# Patient Record
Sex: FEMALE | Race: BLACK OR AFRICAN AMERICAN | Employment: FULL TIME | URBAN - NONMETROPOLITAN AREA
[De-identification: names, ages, dates, MRNs, and addresses within clinical notes are randomized per-mention and may not be internally consistent; named-entity substitution may affect disease eponyms.]

---

## 2023-02-10 ENCOUNTER — APPOINTMENT (OUTPATIENT)
Dept: ULTRASOUND IMAGING | Age: 33
End: 2023-02-10
Attending: INTERNAL MEDICINE
Payer: COMMERCIAL

## 2023-02-10 ENCOUNTER — HOSPITAL ENCOUNTER (EMERGENCY)
Age: 33
Discharge: HOME | End: 2023-02-10
Attending: INTERNAL MEDICINE | Admitting: INTERNAL MEDICINE
Payer: COMMERCIAL

## 2023-02-10 VITALS
SYSTOLIC BLOOD PRESSURE: 125 MMHG | WEIGHT: 144 LBS | TEMPERATURE: 97.7 F | OXYGEN SATURATION: 100 % | DIASTOLIC BLOOD PRESSURE: 73 MMHG | HEIGHT: 65 IN | RESPIRATION RATE: 16 BRPM | BODY MASS INDEX: 23.99 KG/M2 | HEART RATE: 78 BPM

## 2023-02-10 DIAGNOSIS — O20.0 THREATENED ABORTION: Primary | ICD-10-CM

## 2023-02-10 LAB
ABO + RH BLD: NORMAL
ANION GAP SERPL CALC-SCNC: 8 MMOL/L (ref 3–18)
BASOPHILS # BLD: 0 K/UL (ref 0–0.1)
BASOPHILS NFR BLD: 0 % (ref 0–2)
BUN SERPL-MCNC: 4 MG/DL (ref 7–18)
BUN/CREAT SERPL: 6 (ref 12–20)
CA-I BLD-MCNC: 9 MG/DL (ref 8.5–10.1)
CHLORIDE SERPL-SCNC: 105 MMOL/L (ref 100–111)
CO2 SERPL-SCNC: 25 MMOL/L (ref 21–32)
CREAT SERPL-MCNC: 0.67 MG/DL (ref 0.6–1.3)
DIFFERENTIAL METHOD BLD: ABNORMAL
EOSINOPHIL # BLD: 0 K/UL (ref 0–0.4)
EOSINOPHIL NFR BLD: 0 % (ref 0–5)
ERYTHROCYTE [DISTWIDTH] IN BLOOD BY AUTOMATED COUNT: 12.6 % (ref 11.6–14.5)
GLUCOSE SERPL-MCNC: 93 MG/DL (ref 74–99)
HCG SERPL-ACNC: 1383 MIU/ML (ref 0–10)
HCT VFR BLD AUTO: 36.4 % (ref 35–45)
HGB BLD-MCNC: 12 G/DL (ref 12–16)
IMM GRANULOCYTES # BLD AUTO: 0 K/UL (ref 0–0.04)
IMM GRANULOCYTES NFR BLD AUTO: 0 % (ref 0–0.5)
LYMPHOCYTES # BLD: 1.8 K/UL (ref 0.9–3.6)
LYMPHOCYTES NFR BLD: 28 % (ref 21–52)
MCH RBC QN AUTO: 30.8 PG (ref 24–34)
MCHC RBC AUTO-ENTMCNC: 33 G/DL (ref 31–37)
MCV RBC AUTO: 93.6 FL (ref 78–100)
MONOCYTES # BLD: 0.4 K/UL (ref 0.05–1.2)
MONOCYTES NFR BLD: 6 % (ref 3–10)
NEUTS SEG # BLD: 4.1 K/UL (ref 1.8–8)
NEUTS SEG NFR BLD: 66 % (ref 40–73)
NRBC # BLD: 0 K/UL (ref 0–0.01)
NRBC BLD-RTO: 0 PER 100 WBC
PLATELET # BLD AUTO: 282 K/UL (ref 135–420)
PMV BLD AUTO: 10.3 FL (ref 9.2–11.8)
POTASSIUM SERPL-SCNC: 3.9 MMOL/L (ref 3.5–5.5)
RBC # BLD AUTO: 3.89 M/UL (ref 4.2–5.3)
SODIUM SERPL-SCNC: 138 MMOL/L (ref 136–145)
WBC # BLD AUTO: 6.3 K/UL (ref 4.6–13.2)

## 2023-02-10 PROCEDURE — 76801 OB US < 14 WKS SINGLE FETUS: CPT

## 2023-02-10 PROCEDURE — 84702 CHORIONIC GONADOTROPIN TEST: CPT

## 2023-02-10 PROCEDURE — 99284 EMERGENCY DEPT VISIT MOD MDM: CPT | Performed by: INTERNAL MEDICINE

## 2023-02-10 PROCEDURE — 86900 BLOOD TYPING SEROLOGIC ABO: CPT

## 2023-02-10 PROCEDURE — 85025 COMPLETE CBC W/AUTO DIFF WBC: CPT

## 2023-02-10 PROCEDURE — 80048 BASIC METABOLIC PNL TOTAL CA: CPT

## 2023-02-10 RX ORDER — VITAMIN A ACETATE, BETA CAROTENE, ASCORBIC ACID, CHOLECALCIFEROL, .ALPHA.-TOCOPHEROL ACETATE, DL-, THIAMINE MONONITRATE, RIBOFLAVIN, NIACINAMIDE, PYRIDOXINE HYDROCHLORIDE, FOLIC ACID, CYANOCOBALAMIN, CALCIUM CARBONATE, FERROUS FUMARATE, ZINC OXIDE, CUPRIC OXIDE 3080; 12; 120; 400; 1; 1.84; 3; 20; 22; 920; 25; 200; 27; 10; 2 [IU]/1; UG/1; MG/1; [IU]/1; MG/1; MG/1; MG/1; MG/1; MG/1; [IU]/1; MG/1; MG/1; MG/1; MG/1; MG/1
1 TABLET, FILM COATED ORAL DAILY
COMMUNITY

## 2023-02-10 NOTE — ED PROVIDER NOTES
[facility] EMERGENCY DEPT  EMERGENCY DEPARTMENT HISTORY AND PHYSICAL EXAM      Date: 2/10/2023  Patient Name: Nicole Gill  MRN: [id number]  [place]: 1990  Date of evaluation: 2/10/2023  Provider: Jorje Meraz MD   Note Started: 5:24 PM 2/10/23    HISTORY OF PRESENT ILLNESS     Chief Complaint   Patient presents with    Vaginal Bleeding       History Provided By: Patient    HPI: Nicole Gill, 33 y.o. female with no significant medical history is with vaginal spotting. Patient is  2 para 1. She had a period 2023. She states that she took a home pregnancy test that was positive and when she noted some spotting today she came to have this evaluated. She denies any pelvic pain. No vaginal discharge. She states that she is healthy takes no medications and has no known drug allergies. OB: [facility]  PAST MEDICAL HISTORY   Past Medical History:  History reviewed. No pertinent past medical history. Past Surgical History:  History reviewed. No pertinent surgical history. Family History:  History reviewed. No pertinent family history. Social History:  Social History     Tobacco Use    Smoking status: Never    Smokeless tobacco: Never   Vaping Use    Vaping Use: Never used   Substance Use Topics    Alcohol use: Not Currently    Drug use: Never       Allergies:  No Known Allergies    PCP: Oswald Leslie NP    Current Meds:   Previous Medications    PRENATAL VIT-CALCIUM-IRON-FA (PRENATAL PLUS, CALCIUM CARB,) 27 MG IRON- 1 MG TAB    Take 1 Tablet by mouth daily. REVIEW OF SYSTEMS   Review of Systems   Constitutional:  Negative for chills and fever. HENT:  Negative for sore throat. Respiratory:  Negative for cough and shortness of breath. Gastrointestinal:  Negative for abdominal pain, diarrhea, nausea and vomiting. Genitourinary:  Positive for vaginal bleeding. Negative for dysuria and vaginal discharge. Musculoskeletal:  Negative for arthralgias. Neurological:  Negative for headaches. Psychiatric/Behavioral:  Negative for confusion. Positives and Pertinent negatives as per HPI. PHYSICAL EXAM     ED Triage Vitals [02/10/23 1437]   ED Encounter Vitals Group      /75      Pulse (Heart Rate) 80      Resp Rate 17      Temp 97.7 °F (36.5 °C)      Temp src       O2 Sat (%) 99 %      Weight 144 lb      Height 5' 5\"      Physical Exam  Vitals and nursing note reviewed. Constitutional:       Appearance: Normal appearance. Eyes:      Extraocular Movements: Extraocular movements intact. Conjunctiva/sclera: Conjunctivae normal.   Cardiovascular:      Rate and Rhythm: Regular rhythm. Heart sounds: Normal heart sounds. No murmur heard. Pulmonary:      Effort: No respiratory distress. Breath sounds: Normal breath sounds. No wheezing. Abdominal:      General: Bowel sounds are normal.      Tenderness: There is no abdominal tenderness. There is no guarding. Genitourinary:     Comments: AMIE [place]  Normal external genitalia; os closed; small amount of blood from os. No adnexal pain or masses; uterus feels normal.  Skin:     General: Skin is warm and dry. Capillary Refill: Capillary refill takes less than 2 seconds. Neurological:      Mental Status: She is oriented to person, place, and time.        SCREENINGS               No data recorded      LAB, EKG AND DIAGNOSTIC RESULTS   Labs:  Recent Results (from the past 12 hour(s))   CBC WITH AUTOMATED DIFF    Collection Time: 02/10/23  4:30 PM   Result Value Ref Range    WBC 6.3 4.6 - 13.2 K/uL    RBC 3.89 (L) 4.20 - 5.30 M/uL    HGB 12.0 12.0 - 16.0 g/dL    HCT 36.4 35.0 - 45.0 %    MCV 93.6 78.0 - 100.0 FL    MCH 30.8 24.0 - 34.0 PG    MCHC 33.0 31.0 - 37.0 g/dL    RDW 12.6 11.6 - 14.5 %    PLATELET 716 660 - 182 K/uL    MPV 10.3 9.2 - 11.8 FL    NRBC 0.0 0.0  WBC    ABSOLUTE NRBC 0.00 0.00 - 0.01 K/uL    NEUTROPHILS 66 40 - 73 %    LYMPHOCYTES 28 21 - 52 %    MONOCYTES 6 3 - 10 %    EOSINOPHILS 0 0 - 5 %    BASOPHILS 0 0 - 2 %    IMMATURE GRANULOCYTES 0 0 - 0.5 %    ABS. NEUTROPHILS 4.1 1.8 - 8.0 K/UL    ABS. LYMPHOCYTES 1.8 0.9 - 3.6 K/UL    ABS. MONOCYTES 0.4 0.05 - 1.2 K/UL    ABS. EOSINOPHILS 0.0 0.0 - 0.4 K/UL    ABS. BASOPHILS 0.0 0.0 - 0.1 K/UL    ABS. IMM. GRANS. 0.0 0.00 - 0.04 K/UL    DF AUTOMATED     BLOOD TYPE, (ABO+RH)    Collection Time: 02/10/23  4:30 PM   Result Value Ref Range    ABO/Rh(D) O Positive    METABOLIC PANEL, BASIC    Collection Time: 02/10/23  4:30 PM   Result Value Ref Range    Sodium 138 136 - 145 mmol/L    Potassium 3.9 3.5 - 5.5 mmol/L    Chloride 105 100 - 111 mmol/L    CO2 25 21 - 32 mmol/L    Anion gap 8 3.0 - 18.0 mmol/L    Glucose 93 74 - 99 mg/dL    BUN 4 (L) 7 - 18 mg/dL    Creatinine 0.67 0.60 - 1.30 mg/dL    BUN/Creatinine ratio 6 (L) 12 - 20      eGFR >60 >60 ml/min/1.73m2    Calcium 9.0 8.5 - 10.1 mg/dL   BETA HCG, QT    Collection Time: 02/10/23  4:30 PM   Result Value Ref Range    Beta HCG, QT 1,383.0 (H) 0 - 10 mIU/mL       EKG: Initial EKG interpreted by me. Shows   Radiologic Studies:  Non-plain film images such as CT, Ultrasound and MRI are read by the radiologist. Plain radiographic images are visualized and preliminarily interpreted by the ED Provider with the below findings:     34 yo  that presents with vaginal spotting. DDX: Vaginal bleeding: To include but not limited to the following: threatened miscarriage, ectopic pregnancy, pregnancy, dysmenorrhea, trauma, PID, STD, uterine fibroids, endometriosis, UTI. Will get labs and US     6:56 PM  Pt not in pain; her pelvic and abdominal exam are benign. Rh+; hcg 1380; advised to return in 2 days for recheck of HCG and to return sooner if she develops pain. Possible ectopic or completed Ab    Interpretation per the Radiologist below, if available at the time of this note:  US PREG UTS < 14 WKS SNGL    Result Date: 2/10/2023  INDICATION: Vaginal bleeding with pregnancy.  COMPARISON: None EXAM: Real-time transabdominal pelvic and transvaginal pelvic ultrasound examinations. FINDINGS: Real-time transabdominal pelvic ultrasound evaluation was first performed although the urinary bladder is not distended and therefore there is substantially suboptimal assessment of the pelvic contents precluding assessment of the uterus and adnexal structures. For this reason, further evaluation with transvaginal ultrasound was performed. Transvaginal pelvic ultrasound demonstrates uterine size measuring 6.8 x 4.2 x 3.1 cm. No uterine mass is shown. Endometrial stripe thickness measures up to 8 mm. There is no demonstration of intrauterine pregnancy. The cervix appears normal with length of 3.6 cm. Doppler flow documented in the ovaries bilaterally. . The right ovary measures 4.3 x 4.5 x 2.6 cm. A cyst of the right ovary is shown measuring 2.4 2.4 x 1.9 cm. The left ovary measures 3.7 x 3.8 x 2.2 cm without demonstration of adnexal mass. Trace-mild free pelvic fluid is demonstrated. 1. There is no anterior pregnancy demonstrated. Clinical and laboratory correlation is therefore recommended to assess for the potential of ectopic pregnancy or other potential causes for lack of demonstration. 2. 3.7 x 3.8 x 2.2 cm right ovarian cyst. 3. No adnexal mass demonstrated. 4. Trace-mild free pelvic fluid. PROCEDURES   Unless otherwise noted below, none. Performed by: Nichelle Clarke MD   Procedures      CRITICAL CARE TIME       ED COURSE and DIFFERENTIAL DIAGNOSIS/MDM   Vitals:    Vitals:    02/10/23 1437   BP: 131/75   Pulse: 80   Resp: 17   Temp: 97.7 °F (36.5 °C)   SpO2: 99%   Weight: 65.3 kg (144 lb)   Height: 5' 5\" (1.651 m)        Patient was given the following medications:  Medications - No data to display    CONSULTS: (Who and What was discussed)  None   FINAL IMPRESSION     1. Threatened           DISPOSITION/PLAN   Discharged    Discharge Note: The patient is stable for discharge home.  The signs, symptoms, diagnosis, and discharge instructions have been discussed, understanding conveyed, and agreed upon. The patient is to follow up as recommended or return to ER should their symptoms worsen. PATIENT REFERRED TO:  Follow-up Information       Follow up With Specialties Details Why Contact Info    [facility] EMERGENCY DEPT Emergency Medicine In 2 days recheck HCG [address] [place] [address]  [phone]              DISCHARGE MEDICATIONS:  Current Discharge Medication List            DISCONTINUED MEDICATIONS:  Current Discharge Medication List          I am the Primary Clinician of Record: Lucian Bah MD (electronically signed)    (Please note that parts of this dictation were completed with voice recognition software. Quite often unanticipated grammatical, syntax, homophones, and other interpretive errors are inadvertently transcribed by the computer software. Please disregards these errors.  Please excuse any errors that have escaped final proofreading.)

## 2023-02-10 NOTE — ED TRIAGE NOTES
Pt reports positive pregnancy test on jan 31 of this year, reports positive today as well, however, she started having some spotting last night.

## 2023-02-12 ENCOUNTER — HOSPITAL ENCOUNTER (EMERGENCY)
Age: 33
Discharge: HOME | End: 2023-02-12
Attending: FAMILY MEDICINE
Payer: COMMERCIAL

## 2023-02-12 VITALS
WEIGHT: 144 LBS | DIASTOLIC BLOOD PRESSURE: 73 MMHG | SYSTOLIC BLOOD PRESSURE: 116 MMHG | OXYGEN SATURATION: 100 % | RESPIRATION RATE: 19 BRPM | BODY MASS INDEX: 23.99 KG/M2 | HEART RATE: 84 BPM | TEMPERATURE: 97.5 F | HEIGHT: 65 IN

## 2023-02-12 DIAGNOSIS — Z34.90 PREGNANCY, UNSPECIFIED GESTATIONAL AGE: Primary | ICD-10-CM

## 2023-02-12 LAB — HCG SERPL-ACNC: 2271 MIU/ML (ref 0–10)

## 2023-02-12 PROCEDURE — 84702 CHORIONIC GONADOTROPIN TEST: CPT

## 2023-02-12 PROCEDURE — 99283 EMERGENCY DEPT VISIT LOW MDM: CPT

## 2023-02-12 ASSESSMENT — ENCOUNTER SYMPTOMS
VOMITING: 0
NAUSEA: 1

## 2023-02-12 ASSESSMENT — LIFESTYLE VARIABLES
HOW OFTEN DO YOU HAVE A DRINK CONTAINING ALCOHOL: NEVER
HOW MANY STANDARD DRINKS CONTAINING ALCOHOL DO YOU HAVE ON A TYPICAL DAY: PATIENT DOES NOT DRINK

## 2023-02-12 ASSESSMENT — PAIN - FUNCTIONAL ASSESSMENT: PAIN_FUNCTIONAL_ASSESSMENT: 0-10

## 2023-02-12 ASSESSMENT — PAIN SCALES - GENERAL: PAINLEVEL_OUTOF10: 0

## 2023-02-12 NOTE — DISCHARGE INSTRUCTIONS
As we spoke, your quantitative hCG was 2271. Follow-up with your primary care doctor if there is any questions or concerns or follow-up with your OB/GYN. Return to the emergency department start have any bleeding or pain. Or if there are any other questions.

## 2023-02-12 NOTE — ED TRIAGE NOTES
Pt reports she is here for repeat lab work at the direction of the ED physician that saw her two days ago, reports HCG qt needed.

## 2023-02-12 NOTE — ED PROVIDER NOTES
[facility] EMERGENCY DEPT  EMERGENCY DEPARTMENT ENCOUNTER      Pt Name: Pat Singleton  MRN: [id number]  [place] 1990  Date of evaluation: 2023  Provider: Dexter Cortes DO    CHIEF COMPLAINT       Chief Complaint   Patient presents with    Labs Only         HISTORY OF PRESENT ILLNESS   (Location/Symptom, Timing/Onset, Context/Setting, Quality, Duration, Modifying Factors, Severity)  Note limiting factors. Pat Singleton is a 33 y.o. female who presents to the emergency department      Patient comes in stating that she is a  diagnosed with pregnancy 2 days ago. States she had some spotting. Was told to come in in 48 hours for repeat quantitative hCG. No longer having any spotting last time was this morning. Her first pregnancy went to term without any complications. States that she is slightly nauseated. But not vomiting. Has not use any vaginal discharge or bleeding    The history is provided by the patient. No  was used. Nursing Notes were reviewed. REVIEW OF SYSTEMS    (2-9 systems for level 4, 10 or more for level 5)     Review of Systems   Gastrointestinal:  Positive for nausea. Negative for vomiting. Genitourinary:         No current vaginal spotting. No vaginal bleeding or discharge   All other systems reviewed and are negative. Except as noted above the remainder of the review of systems was reviewed and negative. PAST MEDICAL HISTORY   History reviewed. No pertinent past medical history. SURGICAL HISTORY     History reviewed. No pertinent surgical history. CURRENT MEDICATIONS       Previous Medications    PRENATAL VIT-FE FUMARATE-FA (PRENATAL PO)    Take 1 tablet by mouth daily       ALLERGIES     Patient has no known allergies. FAMILY HISTORY     History reviewed. No pertinent family history.        SOCIAL HISTORY       Social History     Socioeconomic History    Marital status: Single     Spouse name: None    Number of children: None    Years of education: None    Highest education level: None   Tobacco Use    Smoking status: Never     Passive exposure: Never    Smokeless tobacco: Never   Vaping Use    Vaping Use: Never used   Substance and Sexual Activity    Alcohol use: Not Currently    Drug use: Never       SCREENINGS         Ray Coma Scale  Eye Opening: Spontaneous  Best Verbal Response: Oriented  Best Motor Response: Obeys commands  [place] Coma Scale Score: 15                     CIWA Assessment  BP: 116/73  Heart Rate: 84  Nausea and Vomiting: no nausea and no vomiting  Tactile Disturbances: none  Tremor: no tremor  Auditory Disturbances: not present  Paroxysmal Sweats: no sweat visible  Visual Disturbances: not present  Anxiety: no anxiety, at ease  Headache, Fullness in Head: none present  Orientation and Clouding of Sensorium: oriented and can do serial additions                 PHYSICAL EXAM    (up to 7 for level 4, 8 or more for level 5)     ED Triage Vitals [02/12/23 1423]   BP Temp Temp Source Heart Rate Resp SpO2 Height Weight   116/73 97.5 °F (36.4 °C) Oral 84 19 100 % 5' 5\" (1.651 m) 144 lb (65.3 kg)       Physical Exam  Constitutional:       Appearance: Normal appearance. HENT:      Head: Normocephalic and atraumatic. Nose: Nose normal.      Mouth/Throat:      Mouth: Mucous membranes are moist.   Eyes:      Pupils: Pupils are equal, round, and reactive to light. Cardiovascular:      Rate and Rhythm: Normal rate. Pulmonary:      Effort: Pulmonary effort is normal.   Skin:     General: Skin is warm and dry. Neurological:      General: No focal deficit present. Mental Status: She is alert.    Psychiatric:         Mood and Affect: Mood normal.       DIAGNOSTIC RESULTS     EKG: All EKG's are interpreted by the Emergency Department Physician who either signs or Co-signs this chart in the absence of a cardiologist.        RADIOLOGY:   Non-plain film images such as CT, Ultrasound and MRI are read by the radiologistLynne Schmidt radiographic images are visualized and preliminarily interpreted by the emergency physician with the below findings:        Interpretation per the Radiologist below, if available at the time of this note:    No orders to display         ED BEDSIDE ULTRASOUND:   Performed by ED Physician - none    LABS:  Labs Reviewed   HCG, QUANTITATIVE, PREGNANCY - Abnormal; Notable for the following components:       Result Value    hCG Quant 2,271.0 (*)     All other components within normal limits       All other labs were within normal range or not returned as of this dictation. EMERGENCY DEPARTMENT COURSE and DIFFERENTIAL DIAGNOSIS/MDM:   Vitals:    Vitals:    02/12/23 1423   BP: 116/73   Pulse: 84   Resp: 19   Temp: 97.5 °F (36.4 °C)   TempSrc: Oral   SpO2: 100%   Weight: 144 lb (65.3 kg)   Height: 5' 5\" (1.651 m)           Medical Decision Making  I will order quant hCG and compared to the the first quant from 2 days ago. Quantitative hCG was 1383 2 days ago. She is aware that her Sonia Grumbling is now 2271. She will follow-up with her primary care doctor/OB/GYN. Amount and/or Complexity of Data Reviewed  Labs: ordered. REASSESSMENT          CRITICAL CARE TIME   Total Critical Care time was 0 minutes, excluding separately reportable procedures. There was a high probability of clinically significant/life threatening deterioration in the patient's condition which required my urgent intervention. CONSULTS:  None    PROCEDURES:  Unless otherwise noted below, none     Procedures        FINAL IMPRESSION    No diagnosis found. DISPOSITION/PLAN   DISPOSITION        PATIENT REFERRED TO:  No follow-up provider specified. DISCHARGE MEDICATIONS:  New Prescriptions    No medications on file     Controlled Substances Monitoring:     No flowsheet data found.     (Please note that portions of this note were completed with a voice recognition program.  Efforts were made to edit the dictations but occasionally words are mis-transcribed.)    Corinne Burrell DO (electronically signed)  Attending Emergency Physician           Corinne Burrell DO  02/12/23 8632

## 2023-02-17 ENCOUNTER — APPOINTMENT (OUTPATIENT)
Age: 33
End: 2023-02-17
Payer: COMMERCIAL

## 2023-02-17 ENCOUNTER — HOSPITAL ENCOUNTER (EMERGENCY)
Age: 33
Discharge: HOME | End: 2023-02-17
Attending: FAMILY MEDICINE
Payer: COMMERCIAL

## 2023-02-17 VITALS
WEIGHT: 144 LBS | RESPIRATION RATE: 18 BRPM | BODY MASS INDEX: 23.99 KG/M2 | DIASTOLIC BLOOD PRESSURE: 72 MMHG | OXYGEN SATURATION: 100 % | HEIGHT: 65 IN | SYSTOLIC BLOOD PRESSURE: 130 MMHG | TEMPERATURE: 97.7 F | HEART RATE: 82 BPM

## 2023-02-17 DIAGNOSIS — N93.9 VAGINAL SPOTTING: ICD-10-CM

## 2023-02-17 DIAGNOSIS — O26.91 COMPLICATION OF PREGNANCY IN FIRST TRIMESTER: Primary | ICD-10-CM

## 2023-02-17 LAB
ABO + RH BLD: NORMAL
ALBUMIN SERPL-MCNC: 3.7 G/DL (ref 3.4–5)
ALBUMIN/GLOB SERPL: 1 (ref 0.8–1.7)
ALP SERPL-CCNC: 56 U/L (ref 45–117)
ALT SERPL-CCNC: 19 U/L (ref 13–56)
ANION GAP SERPL CALC-SCNC: 5 MMOL/L (ref 3–18)
APPEARANCE UR: CLEAR
AST SERPL W P-5'-P-CCNC: 9 U/L (ref 10–38)
BASOPHILS # BLD: 0 K/UL (ref 0–0.1)
BASOPHILS NFR BLD: 0 % (ref 0–2)
BILIRUB SERPL-MCNC: 0.6 MG/DL (ref 0.2–1)
BILIRUB UR QL: NEGATIVE
BUN SERPL-MCNC: 6 MG/DL (ref 7–18)
BUN/CREAT SERPL: 9 (ref 12–20)
CA-I BLD-MCNC: 8.9 MG/DL (ref 8.5–10.1)
CHLORIDE SERPL-SCNC: 105 MMOL/L (ref 100–111)
CLUE CELLS VAG QL WET PREP: NORMAL
CO2 SERPL-SCNC: 26 MMOL/L (ref 21–32)
COLOR UR: YELLOW
CREAT SERPL-MCNC: 0.66 MG/DL (ref 0.6–1.3)
DIFFERENTIAL METHOD BLD: ABNORMAL
EOSINOPHIL # BLD: 0 K/UL (ref 0–0.4)
EOSINOPHIL NFR BLD: 0 % (ref 0–5)
ERYTHROCYTE [DISTWIDTH] IN BLOOD BY AUTOMATED COUNT: 12.5 % (ref 11.6–14.5)
GLOBULIN SER CALC-MCNC: 3.7 G/DL (ref 2–4)
GLUCOSE SERPL-MCNC: 136 MG/DL (ref 74–99)
GLUCOSE UR STRIP.AUTO-MCNC: NEGATIVE MG/DL
HCG SERPL-ACNC: 3162 MIU/ML (ref 0–10)
HCT VFR BLD AUTO: 36.3 % (ref 35–45)
HGB BLD-MCNC: 12.1 G/DL (ref 12–16)
HGB UR QL STRIP: NEGATIVE
IMM GRANULOCYTES # BLD AUTO: 0 K/UL (ref 0–0.04)
IMM GRANULOCYTES NFR BLD AUTO: 0 % (ref 0–0.5)
KETONES UR QL STRIP.AUTO: NEGATIVE MG/DL
LEUKOCYTE ESTERASE UR QL STRIP.AUTO: NEGATIVE
LYMPHOCYTES # BLD: 1.9 K/UL (ref 0.9–3.6)
LYMPHOCYTES NFR BLD: 30 % (ref 21–52)
MCH RBC QN AUTO: 31.5 PG (ref 24–34)
MCHC RBC AUTO-ENTMCNC: 33.3 G/DL (ref 31–37)
MCV RBC AUTO: 94.5 FL (ref 78–100)
MONOCYTES # BLD: 0.3 K/UL (ref 0.05–1.2)
MONOCYTES NFR BLD: 5 % (ref 3–10)
NEUTS SEG # BLD: 4 K/UL (ref 1.8–8)
NEUTS SEG NFR BLD: 65 % (ref 40–73)
NITRITE UR QL STRIP.AUTO: NEGATIVE
NRBC # BLD: 0 K/UL (ref 0–0.01)
NRBC BLD-RTO: 0 PER 100 WBC
PH UR STRIP: 7.5 (ref 5–8)
PLATELET # BLD AUTO: 254 K/UL (ref 135–420)
PMV BLD AUTO: 10.1 FL (ref 9.2–11.8)
POTASSIUM SERPL-SCNC: 4 MMOL/L (ref 3.5–5.5)
PROT SERPL-MCNC: 7.4 G/DL (ref 6.4–8.2)
PROT UR STRIP-MCNC: NEGATIVE MG/DL
RBC # BLD AUTO: 3.84 M/UL (ref 4.2–5.3)
SODIUM SERPL-SCNC: 136 MMOL/L (ref 136–145)
SP GR UR REFRACTOMETRY: 1 (ref 1–1.03)
T VAGINALIS VAG QL WET PREP: NORMAL
UROBILINOGEN UR QL STRIP.AUTO: 0.2 EU/DL (ref 0.2–1)
WBC # BLD AUTO: 6.2 K/UL (ref 4.6–13.2)

## 2023-02-17 PROCEDURE — 84702 CHORIONIC GONADOTROPIN TEST: CPT

## 2023-02-17 PROCEDURE — 36415 COLL VENOUS BLD VENIPUNCTURE: CPT

## 2023-02-17 PROCEDURE — 76817 TRANSVAGINAL US OBSTETRIC: CPT

## 2023-02-17 PROCEDURE — 86900 BLOOD TYPING SEROLOGIC ABO: CPT

## 2023-02-17 PROCEDURE — 81003 URINALYSIS AUTO W/O SCOPE: CPT

## 2023-02-17 PROCEDURE — 85025 COMPLETE CBC W/AUTO DIFF WBC: CPT

## 2023-02-17 PROCEDURE — 99284 EMERGENCY DEPT VISIT MOD MDM: CPT

## 2023-02-17 PROCEDURE — 87210 SMEAR WET MOUNT SALINE/INK: CPT

## 2023-02-17 PROCEDURE — 80053 COMPREHEN METABOLIC PANEL: CPT

## 2023-02-17 PROCEDURE — 87591 N.GONORRHOEAE DNA AMP PROB: CPT

## 2023-02-17 ASSESSMENT — PAIN SCALES - GENERAL: PAINLEVEL_OUTOF10: 4

## 2023-02-17 ASSESSMENT — PAIN - FUNCTIONAL ASSESSMENT: PAIN_FUNCTIONAL_ASSESSMENT: 0-10

## 2023-02-17 ASSESSMENT — PAIN DESCRIPTION - LOCATION: LOCATION: BACK

## 2023-02-17 NOTE — ED TRIAGE NOTES
Pt states she has been to the ED several times for possible miscarriage, states she had her labs drawn, and the last draw, her numbers had doubled. .   Pt states the bleeding had stopped, but now she is bleeding again   Pt called OB and they told her to come to the ED

## 2023-02-17 NOTE — ED PROVIDER NOTES
[facility] EMERGENCY DEPT  EMERGENCY DEPARTMENT ENCOUNTER      Pt Name: Bennett Holland  MRN: [id number]  [place] 1990  Date of evaluation: 2/17/2023  Provider: Zena Barlow DO    CHIEF COMPLAINT       Chief Complaint   Patient presents with    Pregnancy Problem         HISTORY OF PRESENT ILLNESS   (Location/Symptom, Timing/Onset, Context/Setting, Quality, Duration, Modifying Factors, Severity)  Note limiting factors. Bennett Holland is a 33 y.o. female who presents to the emergency department      Patient is a G2, P1 comes in stating that she is pregnant. Noticed that she is having some bright red blood just with wiping. Worse yesterday than today. She did have some back pain yesterday but not having any abdominal pains or back pain today. Did states she is slightly nauseated. Was recently seen here in the emergency department for a quantitative hCG. Has not been sexually active in about 3 days. Her first pregnancy was a complicated pregnancy did see maternal-fetal medicine. Her OB/GYN is at [facility]. She has not taken anything for this. I have a phone call into OB for discussion of her ultrasound as well as her beta hCG trending downwards. Patient is aware still denying any abdominal pains or back pains. High suspicion for miscarriage. Nursing Notes were reviewed. REVIEW OF SYSTEMS    (2-9 systems for level 4, 10 or more for level 5)     Review of Systems   Genitourinary:  Positive for vaginal bleeding. Negative for vaginal discharge and vaginal pain. All other systems reviewed and are negative. Except as noted above the remainder of the review of systems was reviewed and negative. PAST MEDICAL HISTORY   History reviewed. No pertinent past medical history. SURGICAL HISTORY     History reviewed. No pertinent surgical history. CURRENT MEDICATIONS       Previous Medications    No medications on file       ALLERGIES     Patient has no known allergies.     FAMILY HISTORY     History reviewed. No pertinent family history. SOCIAL HISTORY       Social History     Socioeconomic History    Marital status: Single     Spouse name: None    Number of children: None    Years of education: None    Highest education level: None   Tobacco Use    Smoking status: Never    Smokeless tobacco: Never   Substance and Sexual Activity    Alcohol use: Not Currently       SCREENINGS         [place] Coma Scale  Eye Opening: Spontaneous  Best Verbal Response: Oriented  Best Motor Response: Obeys commands  [place] Coma Scale Score: 15                     CIWA Assessment  BP: 130/72  Heart Rate: 91                 PHYSICAL EXAM    (up to 7 for level 4, 8 or more for level 5)     ED Triage Vitals [02/17/23 1101]   BP Temp Temp src Heart Rate Resp SpO2 Height Weight   130/72 97.7 °F (36.5 °C) -- 91 18 100 % 5' 5\" (1.651 m) 144 lb (65.3 kg)       Physical Exam  Vitals and nursing note reviewed. Constitutional:       Appearance: Normal appearance. HENT:      Head: Normocephalic and atraumatic. Nose: Nose normal.      Mouth/Throat:      Mouth: Mucous membranes are moist.   Eyes:      Conjunctiva/sclera: Conjunctivae normal.   Cardiovascular:      Rate and Rhythm: Normal rate and regular rhythm. Pulmonary:      Effort: Pulmonary effort is normal.   Musculoskeletal:         General: Normal range of motion. Skin:     General: Skin is warm and dry. Neurological:      General: No focal deficit present. Mental Status: She is alert.    Psychiatric:         Mood and Affect: Mood normal.       DIAGNOSTIC RESULTS     EKG: All EKG's are interpreted by the Emergency Department Physician who either signs or Co-signs this chart in the absence of a cardiologist.        RADIOLOGY:   Non-plain film images such as CT, Ultrasound and MRI are read by the radiologist. Plain radiographic images are visualized and preliminarily interpreted by the emergency physician with the below findings:        Interpretation per the Radiologist below, if available at the time of this note:    US OB TRANSVAGINAL   Final Result   Normal sonographic appearance of the female pelvis. No intrauterine pregnancy. Monitor serial beta-hCG level to determine need for   repeat ultrasound. ED BEDSIDE ULTRASOUND:   Performed by ED Physician - none    LABS:  Labs Reviewed   CBC WITH AUTO DIFFERENTIAL - Abnormal; Notable for the following components:       Result Value    RBC 3.84 (*)     All other components within normal limits   COMPREHENSIVE METABOLIC PANEL - Abnormal; Notable for the following components:    Glucose 136 (*)     BUN 6 (*)     Bun/Cre Ratio 9 (*)     AST 9 (*)     All other components within normal limits   HCG, QUANTITATIVE, PREGNANCY - Abnormal; Notable for the following components:    hCG Quant 3,162.0 (*)     All other components within normal limits   WET PREP, GENITAL   C.TRACHOMATIS N.GONORRHOEAE DNA   URINALYSIS   ABO/RH       All other labs were within normal range or not returned as of this dictation. EMERGENCY DEPARTMENT COURSE and DIFFERENTIAL DIAGNOSIS/MDM:   Vitals:    Vitals:    02/17/23 1101   BP: 130/72   Pulse: 91   Resp: 18   Temp: 97.7 °F (36.5 °C)   SpO2: 100%   Weight: 144 lb (65.3 kg)   Height: 5' 5\" (1.651 m)           Medical Decision Making  Doing any abdominal pains. We will do a wet prep. Check some blood work on her and depending on her blood work and vaginal exam.  May touch base with her OB/GYN    Had a discussion with the patient regarding the findings and all of her lab work including her [address] initially on February 10 it was 1383 on February 12 it was 2271 and today is 5 and has not doubled. Which makes me concerned that there may be a potential miscarriage going on. This is confirmed with an ultrasound which is also not showing an intrauterine pregnancy. This was discussed with OB/GYN from [facility].   And they agree that the patient go home because she is not having abdominal pains. When I went back to see the patient at 4:12 PM and made them aware of my conversation with OB/GYN patient is still denying any abdominal pains. She was given good return precautions including severe pain, any vaginal bleeding or any concerns. Amount and/or Complexity of Data Reviewed  Labs: ordered. Radiology: ordered. Discussion of management or test interpretation with external provider(s): Shadi with OB/GYN            REASSESSMENT   At time of discharge patient still without any abdominal pains. CRITICAL CARE TIME   Total Critical Care time was  minutes, excluding separately reportable procedures. There was a high probability of clinically significant/life threatening deterioration in the patient's condition which required my urgent intervention. CONSULTS:  I spoke with Dr. Catherine Patel, from [facility], she would like to have the patient return in 2 days recommends good precautions to the patient for returning. PROCEDURES:  Unless otherwise noted below, none     Procedures        FINAL IMPRESSION      1. Complication of pregnancy in first trimester    2. Vaginal spotting          DISPOSITION/PLAN   DISPOSITION Decision To Discharge 02/17/2023 04:13:48 PM      PATIENT REFERRED TO:  [facility] EMERGENCY DEPT  [facility] [address] [id number]  [phone]  In 2 days  For quantitative hCG    DISCHARGE MEDICATIONS:  New Prescriptions    No medications on file     Controlled Substances Monitoring:     No flowsheet data found.     (Please note that portions of this note were completed with a voice recognition program.  Efforts were made to edit the dictations but occasionally words are mis-transcribed.)    Janice Landeros DO (electronically signed)  Attending Emergency Physician           Janice Landeros DO  02/17/23 1445

## 2023-02-17 NOTE — DISCHARGE INSTRUCTIONS
As we spoke, your hCG today was 3162. This has not doubled like it should have. You need to return in 2 days for a repeat hCG. Return to the emergency department if you start having abdominal pains or bleeding heavier than a menstrual cycle. You can also return if there is any questions or concerns.

## 2023-02-19 ENCOUNTER — HOSPITAL ENCOUNTER (EMERGENCY)
Age: 33
Discharge: HOME | End: 2023-02-19
Attending: FAMILY MEDICINE
Payer: COMMERCIAL

## 2023-02-19 VITALS
HEIGHT: 65 IN | TEMPERATURE: 97.7 F | RESPIRATION RATE: 16 BRPM | HEART RATE: 90 BPM | DIASTOLIC BLOOD PRESSURE: 70 MMHG | SYSTOLIC BLOOD PRESSURE: 116 MMHG | WEIGHT: 140 LBS | BODY MASS INDEX: 23.32 KG/M2 | OXYGEN SATURATION: 100 %

## 2023-02-19 DIAGNOSIS — Z3A.01 LESS THAN 8 WEEKS GESTATION OF PREGNANCY: Primary | ICD-10-CM

## 2023-02-19 LAB — HCG SERPL-ACNC: 3322 MIU/ML (ref 0–10)

## 2023-02-19 PROCEDURE — 99283 EMERGENCY DEPT VISIT LOW MDM: CPT

## 2023-02-19 PROCEDURE — 36415 COLL VENOUS BLD VENIPUNCTURE: CPT

## 2023-02-19 PROCEDURE — 84702 CHORIONIC GONADOTROPIN TEST: CPT

## 2023-02-19 ASSESSMENT — ENCOUNTER SYMPTOMS
ABDOMINAL PAIN: 0
ABDOMINAL DISTENTION: 0

## 2023-02-19 ASSESSMENT — PAIN - FUNCTIONAL ASSESSMENT: PAIN_FUNCTIONAL_ASSESSMENT: 0-10

## 2023-02-19 ASSESSMENT — PAIN SCALES - GENERAL: PAINLEVEL_OUTOF10: 0

## 2023-02-19 NOTE — ED PROVIDER NOTES
[facility] EMERGENCY DEPT  EMERGENCY DEPARTMENT ENCOUNTER      Pt Name: Erna Payne  MRN: [id number]  [place] 1990  Date of evaluation: 2/19/2023  Provider: Mary Ibarra, [address] [place] [address]       Chief Complaint   Patient presents with    Other         HISTORY OF PRESENT ILLNESS   (Location/Symptom, Timing/Onset, Context/Setting, Quality, Duration, Modifying Factors, Severity)  Note limiting factors. Erna Payne is a 33 y.o. female who presents to the emergency department      Patient comes in for lab test for repeat quant after being here 2 days ago. She states that she is still having some vaginal spotting but no abdominal pains no nausea vomiting. She is a G2, P1, who carried her first pregnancy to full-term without any problems although she was seen by a specialist in [place]. The history is provided by the patient. No  was used. Nursing Notes were reviewed. REVIEW OF SYSTEMS    (2-9 systems for level 4, 10 or more for level 5)     Review of Systems   Gastrointestinal:  Negative for abdominal distention and abdominal pain. Genitourinary:  Positive for vaginal bleeding. Vaginal spotting only not bright red   All other systems reviewed and are negative. Except as noted above the remainder of the review of systems was reviewed and negative. PAST MEDICAL HISTORY   History reviewed. No pertinent past medical history. SURGICAL HISTORY     History reviewed. No pertinent surgical history. CURRENT MEDICATIONS       Previous Medications    No medications on file       ALLERGIES     Patient has no known allergies. FAMILY HISTORY     History reviewed. No pertinent family history.        SOCIAL HISTORY       Social History     Socioeconomic History    Marital status: Single     Spouse name: None    Number of children: None    Years of education: None    Highest education level: None   Tobacco Use    Smoking status: Never    Smokeless tobacco: Never   Substance and Sexual Activity    Alcohol use: Not Currently       SCREENINGS         Ray Coma Scale  Eye Opening: Spontaneous  Best Verbal Response: Oriented  Best Motor Response: Obeys commands  Ray Coma Scale Score: 15                     CIWA Assessment  BP: 116/70  Heart Rate: 90                 PHYSICAL EXAM    (up to 7 for level 4, 8 or more for level 5)     ED Triage Vitals [02/19/23 0911]   BP Temp Temp Source Heart Rate Resp SpO2 Height Weight   116/70 97.7 °F (36.5 °C) Tympanic 90 16 100 % 5' 5\" (1.651 m) 140 lb (63.5 kg)       Physical Exam  Constitutional:       Appearance: Normal appearance. HENT:      Head: Normocephalic and atraumatic. Nose: Nose normal.   Cardiovascular:      Rate and Rhythm: Normal rate. Pulmonary:      Effort: Pulmonary effort is normal.   Skin:     General: Skin is warm and dry. Neurological:      General: No focal deficit present. Mental Status: She is alert. Psychiatric:         Mood and Affect: Mood normal.       DIAGNOSTIC RESULTS     EKG: All EKG's are interpreted by the Emergency Department Physician who either signs or Co-signs this chart in the absence of a cardiologist.        RADIOLOGY:   Non-plain film images such as CT, Ultrasound and MRI are read by the radiologist. Plain radiographic images are visualized and preliminarily interpreted by the emergency physician with the below findings:        Interpretation per the Radiologist below, if available at the time of this note:    No orders to display         ED BEDSIDE ULTRASOUND:   Performed by ED Physician - none    LABS:  Labs Reviewed   HCG, QUANTITATIVE, PREGNANCY - Abnormal; Notable for the following components:       Result Value    hCG Quant 3,322.0 (*)     All other components within normal limits       All other labs were within normal range or not returned as of this dictation.     EMERGENCY DEPARTMENT COURSE and DIFFERENTIAL DIAGNOSIS/MDM:   Vitals:    Vitals:    02/19/23 4303 BP: 116/70   Pulse: 90   Resp: 16   Temp: 97.7 °F (36.5 °C)   TempSrc: Tympanic   SpO2: 100%   Weight: 140 lb (63.5 kg)   Height: 5' 5\" (1.651 m)           Medical Decision Making  We will reorder her quant. Went over her quant. It has only gone up 100 points in 2 days. It has not doubled over the last several visits that she has been here. I did speak with her regarding abdominal pain she is not having any just some minor spotting. I reminded her I spoke with an OB/GYN doctor a few days ago and they would only concerned if she had some pain. She is from [facility]. My recommendation is that she follow-up with OB. As she needs additional work-up and follow-up for possible D&C. I believe this is a nonviable pregnancy. She is not having any pain so topic pregnancy is low in my differential.  It was also low in the differential with the OB/GYN I spoke with a couple days ago with the same presentation just spotting no pain and a non doubling beta-hCG extensive discussion with the patient. She will follow-up with OB/GYN in [facility]. And will return to the emergency department at the first sign of any pain. I gave her return precautions for bleeding as well. Amount and/or Complexity of Data Reviewed  Labs: ordered. REASSESSMENT          CRITICAL CARE TIME   Total Critical Care time was  minutes, excluding separately reportable procedures. There was a high probability of clinically significant/life threatening deterioration in the patient's condition which required my urgent intervention. CONSULTS:  None    PROCEDURES:  Unless otherwise noted below, none     Procedures        FINAL IMPRESSION      1.  Less than 8 weeks gestation of pregnancy          DISPOSITION/PLAN   DISPOSITION Decision To Discharge 02/19/2023 11:09:51 AM      PATIENT REFERRED TO:  ANIA Deleon NP  [address] [place] [address] [phone]    Schedule an appointment as soon as possible for a visit Brien Holland, APRN - NP  [address] [facility] [phone]          DISCHARGE MEDICATIONS:  New Prescriptions    No medications on file     Controlled Substances Monitoring:     No flowsheet data found.     (Please note that portions of this note were completed with a voice recognition program.  Efforts were made to edit the dictations but occasionally words are mis-transcribed.)    Estela Jalloh DO (electronically signed)  Attending Emergency Physician           Estela Jalloh DO  02/19/23 9588

## 2023-02-19 NOTE — ED TRIAGE NOTES
Pt has been seen several times in the ED recently for bleeding with pregnancy  Pt was instructed to return to ED today to have quant drawn for comparison to most recent.   Pt states she has still seen some spotting, but not like it has been   Denies pain

## 2023-02-19 NOTE — DISCHARGE INSTRUCTIONS
As we spoke your quantitative hCG has not doubled. This is concerning that this is not a viable pregnancy. I have given you some information about a threatened miscarriage. It is imperative that you follow-up with OB/GYN by calling tomorrow and scheduling an appointment. If you start having abdominal pains it is imperative that you be seen in an emergency department as there is always a concern about an ectopic pregnancy. .  Since you are not having pains now I am not concerned about that. But have to keep this in the back of her mind. If you start having severe vaginal bleeding worse in a menstrual cycle. You need to be seen as well. Return to the emergency department as any questions or concerns.

## 2023-02-20 LAB
C TRACH RRNA SPEC QL NAA+PROBE: NORMAL
N GONORRHOEA RRNA SPEC QL NAA+PROBE: NEGATIVE
SPECIMEN SOURCE: NORMAL